# Patient Record
Sex: FEMALE | Race: WHITE | NOT HISPANIC OR LATINO | Employment: FULL TIME | URBAN - METROPOLITAN AREA
[De-identification: names, ages, dates, MRNs, and addresses within clinical notes are randomized per-mention and may not be internally consistent; named-entity substitution may affect disease eponyms.]

---

## 2023-05-18 ENCOUNTER — LAB (OUTPATIENT)
Dept: LAB | Facility: LAB | Age: 43
End: 2023-05-18
Payer: COMMERCIAL

## 2023-05-18 ENCOUNTER — OFFICE VISIT (OUTPATIENT)
Dept: PRIMARY CARE | Facility: CLINIC | Age: 43
End: 2023-05-18
Payer: COMMERCIAL

## 2023-05-18 VITALS
WEIGHT: 183 LBS | BODY MASS INDEX: 30.45 KG/M2 | SYSTOLIC BLOOD PRESSURE: 122 MMHG | DIASTOLIC BLOOD PRESSURE: 80 MMHG | HEART RATE: 70 BPM

## 2023-05-18 DIAGNOSIS — F41.1 GENERALIZED ANXIETY DISORDER: ICD-10-CM

## 2023-05-18 DIAGNOSIS — I42.8 NONISCHEMIC CARDIOMYOPATHY (MULTI): ICD-10-CM

## 2023-05-18 DIAGNOSIS — J98.09 BRONCHOSPASTIC AIRWAY DISEASE: Primary | ICD-10-CM

## 2023-05-18 DIAGNOSIS — F34.1 PERSISTENT DEPRESSIVE DISORDER: ICD-10-CM

## 2023-05-18 DIAGNOSIS — L71.9 ROSACEA: ICD-10-CM

## 2023-05-18 DIAGNOSIS — J98.09 BRONCHOSPASTIC AIRWAY DISEASE: ICD-10-CM

## 2023-05-18 PROBLEM — R45.4 IRRITABLE BEHAVIOR: Status: ACTIVE | Noted: 2023-05-18

## 2023-05-18 PROBLEM — L98.9 BENIGN SKIN LESION OF NECK: Status: ACTIVE | Noted: 2023-05-18

## 2023-05-18 PROBLEM — F32.A DEPRESSION: Status: ACTIVE | Noted: 2023-05-18

## 2023-05-18 PROCEDURE — 99214 OFFICE O/P EST MOD 30 MIN: CPT | Performed by: FAMILY MEDICINE

## 2023-05-18 RX ORDER — METOPROLOL SUCCINATE 25 MG/1
1 TABLET, EXTENDED RELEASE ORAL DAILY
COMMUNITY
End: 2024-03-28 | Stop reason: SDUPTHER

## 2023-05-18 RX ORDER — SERTRALINE HYDROCHLORIDE 50 MG/1
50 TABLET, FILM COATED ORAL DAILY
Qty: 90 TABLET | Refills: 1 | Status: SHIPPED | OUTPATIENT
Start: 2023-05-18 | End: 2024-03-28 | Stop reason: SDUPTHER

## 2023-05-18 RX ORDER — DOXYCYCLINE HYCLATE 100 MG
100 TABLET ORAL DAILY
COMMUNITY
End: 2023-05-18 | Stop reason: SDUPTHER

## 2023-05-18 RX ORDER — DOXYCYCLINE HYCLATE 100 MG
100 TABLET ORAL DAILY
Qty: 90 TABLET | Refills: 1 | Status: SHIPPED | OUTPATIENT
Start: 2023-05-18 | End: 2023-11-14

## 2023-05-18 RX ORDER — LEVONORGESTREL 52 MG/1
INTRAUTERINE DEVICE INTRAUTERINE
COMMUNITY

## 2023-05-18 RX ORDER — LISINOPRIL 2.5 MG/1
1 TABLET ORAL DAILY
COMMUNITY
End: 2024-03-28 | Stop reason: SDUPTHER

## 2023-05-18 RX ORDER — BUDESONIDE, GLYCOPYRROLATE, AND FORMOTEROL FUMARATE 160; 9; 4.8 UG/1; UG/1; UG/1
2 AEROSOL, METERED RESPIRATORY (INHALATION) 2 TIMES DAILY
Qty: 5.9 G | Refills: 0 | COMMUNITY
Start: 2023-05-18

## 2023-05-18 RX ORDER — SERTRALINE HYDROCHLORIDE 50 MG/1
50 TABLET, FILM COATED ORAL DAILY
COMMUNITY
End: 2023-05-18 | Stop reason: SDUPTHER

## 2023-05-18 ASSESSMENT — PATIENT HEALTH QUESTIONNAIRE - PHQ9
2. FEELING DOWN, DEPRESSED OR HOPELESS: NOT AT ALL
SUM OF ALL RESPONSES TO PHQ9 QUESTIONS 1 AND 2: 0
1. LITTLE INTEREST OR PLEASURE IN DOING THINGS: NOT AT ALL

## 2023-05-18 NOTE — PROGRESS NOTES
"Subjective   Patient ID: Dandy Antonio is a 42 y.o. female who presents for Anxiety (zoloft) and Cough (Has had for a month and a half).    HPI  Harsh coughing ongoing for the past 1.5 months.  Often spastic, sometimes feeling as though she is going to \"cough up a lung\" or vomit.  No known exposure to whooping cough, but has never been diagnosed with that either.    Started with allergy symptoms, took some Zyrtec which helped all the other symptoms but the coughing.    Sometimes the coughing becomes worse when lying down at night but denies any reflux-like symptoms.  Minimal PND, coughing nonproductive, mainly harsh to the point where her chest is sore.  She has had some instances of what she describes as wheezing, but those episodes are far and few between.    Her daughter has apparently started to cough as well, so is not certain if she has passed it to her daughter.  Denies any associated fever, chills, body aches.    History of cardiomyopathy diagnosed after her first pregnancy approximately 14 years ago.  She is currently taking medication noted in the chart, requesting refills.    Review of Systems  All pertinent positive symptoms are included in the history of present illness.    All other systems have been reviewed and are negative and noncontributory to this patient's current ailments.    Allergies   Allergen Reactions    Macrolide Antibiotics Rash    Penicillins Rash       Immunization History   Administered Date(s) Administered    Novel influenza-H1N1-09, preservative-free 11/23/2009    Pfizer Purple Cap SARS-CoV-2 05/23/2021, 06/13/2021    Tdap 03/28/2019       Objective   Visit Vitals  /80   Pulse 70   Wt 83 kg (183 lb)   BMI 30.45 kg/m²   Smoking Status Never   BSA 1.95 m²       Physical Exam  CONSTITUTIONAL - well nourished, well developed, looks like stated age, in no acute distress, not ill-appearing, and not tired appearing  SKIN - normal skin color and pigmentation, normal skin turgor without " rash, lesions, or nodules visualized  HEAD - no trauma, normocephalic  EYES - pupils are equal and reactive to light, extraocular muscles are intact, and normal external exam  ENT - TM's intact, no injection, no signs of infection, uvula midline, normal tongue movement and throat normal, no exudate  NECK - supple without rigidity, no neck mass was observed, no thyromegaly or thyroid nodules  CHEST - clear to auscultation, no wheezing, no crackles and no rales, good effort, rather consistent, dry, spastic cough  CARDIAC - regular rate and regular rhythm, no skipped beats, harsh 3/6 murmur  EXTREMITIES - no edema, no deformities  NEUROLOGICAL - normal gait, normal balance, normal motor, no ataxia, DTRs equal and symmetrical; alert, oriented and no focal signs  PSYCHIATRIC - alert, pleasant and cordial, age-appropriate  IMMUNOLOGIC - no cervical lymphadenopathy     Assessment/Plan   Problem List Items Addressed This Visit       Depression     Stable, no changes to medication recommended         Relevant Medications    sertraline (Zoloft) 50 mg tablet    Generalized anxiety disorder     Stable, no changes to medication recommended         Relevant Medications    sertraline (Zoloft) 50 mg tablet    Nonischemic cardiomyopathy (CMS/HCC)     Continue to follow with cardiology per protocol         Relevant Medications    metoprolol succinate XL (Toprol-XL) 25 mg 24 hr tablet    Rosacea     Stable with use of doxycycline, will send at current dose         Relevant Medications    doxycycline (Vibra-Tabs) 100 mg tablet    Bronchospastic airway disease - Primary     Talked about a plethora of possibilities including reactive airway, whooping cough, bronchitis, postnasal drip, reflux disease, etc.    Start Breztri inhaler 2 puffs twice daily, and if no better in 7 days, please obtain chest x-ray to further evaluate for underlying pathology    I will also send culture for pertussis to determine if that is the underlying source  We  will notify of test results once available         Relevant Medications    budesonide-glycopyr-formoterol (Breztri Aerosphere) 160-9-4.8 mcg/actuation HFA aerosol inhaler    Other Relevant Orders    Bordetella Pertussis/Parapertussis PCR    XR chest 2 views

## 2023-05-18 NOTE — ASSESSMENT & PLAN NOTE
Talked about a plethora of possibilities including reactive airway, whooping cough, bronchitis, postnasal drip, reflux disease, etc.    Start Breztri inhaler 2 puffs twice daily, and if no better in 7 days, please obtain chest x-ray to further evaluate for underlying pathology    I will also send culture for pertussis to determine if that is the underlying source  We will notify of test results once available

## 2024-03-28 ENCOUNTER — LAB (OUTPATIENT)
Dept: LAB | Facility: LAB | Age: 44
End: 2024-03-28
Payer: COMMERCIAL

## 2024-03-28 ENCOUNTER — OFFICE VISIT (OUTPATIENT)
Dept: PRIMARY CARE | Facility: CLINIC | Age: 44
End: 2024-03-28
Payer: COMMERCIAL

## 2024-03-28 VITALS
HEART RATE: 68 BPM | BODY MASS INDEX: 31.82 KG/M2 | SYSTOLIC BLOOD PRESSURE: 124 MMHG | WEIGHT: 191 LBS | DIASTOLIC BLOOD PRESSURE: 80 MMHG | HEIGHT: 65 IN

## 2024-03-28 DIAGNOSIS — Z11.4 ENCOUNTER FOR SCREENING FOR HIV: ICD-10-CM

## 2024-03-28 DIAGNOSIS — Z12.11 ENCOUNTER FOR SCREENING FOR MALIGNANT NEOPLASM OF COLON: ICD-10-CM

## 2024-03-28 DIAGNOSIS — Z11.59 NEED FOR HEPATITIS C SCREENING TEST: ICD-10-CM

## 2024-03-28 DIAGNOSIS — Z00.00 ANNUAL PHYSICAL EXAM: ICD-10-CM

## 2024-03-28 DIAGNOSIS — F41.1 GENERALIZED ANXIETY DISORDER: ICD-10-CM

## 2024-03-28 DIAGNOSIS — F34.1 PERSISTENT DEPRESSIVE DISORDER: ICD-10-CM

## 2024-03-28 DIAGNOSIS — I42.8 NONISCHEMIC CARDIOMYOPATHY (MULTI): ICD-10-CM

## 2024-03-28 DIAGNOSIS — R63.5 WEIGHT GAIN: ICD-10-CM

## 2024-03-28 DIAGNOSIS — L71.9 ROSACEA: ICD-10-CM

## 2024-03-28 DIAGNOSIS — Z00.00 ANNUAL PHYSICAL EXAM: Primary | ICD-10-CM

## 2024-03-28 DIAGNOSIS — J98.09 BRONCHOSPASTIC AIRWAY DISEASE: ICD-10-CM

## 2024-03-28 PROBLEM — F32.A DEPRESSION: Status: RESOLVED | Noted: 2023-05-18 | Resolved: 2024-03-28

## 2024-03-28 LAB
ALBUMIN SERPL BCP-MCNC: 4.2 G/DL (ref 3.4–5)
ALP SERPL-CCNC: 57 U/L (ref 33–110)
ALT SERPL W P-5'-P-CCNC: 21 U/L (ref 7–45)
ANION GAP SERPL CALC-SCNC: 10 MMOL/L (ref 10–20)
AST SERPL W P-5'-P-CCNC: 18 U/L (ref 9–39)
BASOPHILS # BLD AUTO: 0.12 X10*3/UL (ref 0–0.1)
BASOPHILS NFR BLD AUTO: 1.5 %
BILIRUB SERPL-MCNC: 0.7 MG/DL (ref 0–1.2)
BUN SERPL-MCNC: 11 MG/DL (ref 6–23)
CALCIUM SERPL-MCNC: 9.1 MG/DL (ref 8.6–10.3)
CHLORIDE SERPL-SCNC: 105 MMOL/L (ref 98–107)
CHOLEST SERPL-MCNC: 179 MG/DL (ref 0–199)
CHOLESTEROL/HDL RATIO: 4.1
CO2 SERPL-SCNC: 26 MMOL/L (ref 21–32)
CREAT SERPL-MCNC: 0.66 MG/DL (ref 0.5–1.05)
EGFRCR SERPLBLD CKD-EPI 2021: >90 ML/MIN/1.73M*2
EOSINOPHIL # BLD AUTO: 0.47 X10*3/UL (ref 0–0.7)
EOSINOPHIL NFR BLD AUTO: 5.7 %
ERYTHROCYTE [DISTWIDTH] IN BLOOD BY AUTOMATED COUNT: 12.3 % (ref 11.5–14.5)
GLUCOSE SERPL-MCNC: 101 MG/DL (ref 74–99)
HCT VFR BLD AUTO: 45.5 % (ref 36–46)
HDLC SERPL-MCNC: 43.6 MG/DL
HGB BLD-MCNC: 15 G/DL (ref 12–16)
IMM GRANULOCYTES # BLD AUTO: 0.02 X10*3/UL (ref 0–0.7)
IMM GRANULOCYTES NFR BLD AUTO: 0.2 % (ref 0–0.9)
LDLC SERPL CALC-MCNC: 95 MG/DL
LYMPHOCYTES # BLD AUTO: 2.5 X10*3/UL (ref 1.2–4.8)
LYMPHOCYTES NFR BLD AUTO: 30.3 %
MCH RBC QN AUTO: 31.3 PG (ref 26–34)
MCHC RBC AUTO-ENTMCNC: 33 G/DL (ref 32–36)
MCV RBC AUTO: 95 FL (ref 80–100)
MONOCYTES # BLD AUTO: 0.32 X10*3/UL (ref 0.1–1)
MONOCYTES NFR BLD AUTO: 3.9 %
NEUTROPHILS # BLD AUTO: 4.81 X10*3/UL (ref 1.2–7.7)
NEUTROPHILS NFR BLD AUTO: 58.4 %
NON HDL CHOLESTEROL: 135 MG/DL (ref 0–149)
NRBC BLD-RTO: 0 /100 WBCS (ref 0–0)
PLATELET # BLD AUTO: 304 X10*3/UL (ref 150–450)
POTASSIUM SERPL-SCNC: 4.2 MMOL/L (ref 3.5–5.3)
PROT SERPL-MCNC: 6.8 G/DL (ref 6.4–8.2)
RBC # BLD AUTO: 4.79 X10*6/UL (ref 4–5.2)
SODIUM SERPL-SCNC: 137 MMOL/L (ref 136–145)
TRIGL SERPL-MCNC: 203 MG/DL (ref 0–149)
TSH SERPL-ACNC: 2.2 MIU/L (ref 0.44–3.98)
VLDL: 41 MG/DL (ref 0–40)
WBC # BLD AUTO: 8.2 X10*3/UL (ref 4.4–11.3)

## 2024-03-28 PROCEDURE — 36415 COLL VENOUS BLD VENIPUNCTURE: CPT

## 2024-03-28 PROCEDURE — 99396 PREV VISIT EST AGE 40-64: CPT | Performed by: FAMILY MEDICINE

## 2024-03-28 PROCEDURE — 87389 HIV-1 AG W/HIV-1&-2 AB AG IA: CPT

## 2024-03-28 PROCEDURE — 80053 COMPREHEN METABOLIC PANEL: CPT

## 2024-03-28 PROCEDURE — 86803 HEPATITIS C AB TEST: CPT

## 2024-03-28 PROCEDURE — 84443 ASSAY THYROID STIM HORMONE: CPT

## 2024-03-28 PROCEDURE — 99214 OFFICE O/P EST MOD 30 MIN: CPT | Performed by: FAMILY MEDICINE

## 2024-03-28 PROCEDURE — 85025 COMPLETE CBC W/AUTO DIFF WBC: CPT

## 2024-03-28 PROCEDURE — 80061 LIPID PANEL: CPT

## 2024-03-28 PROCEDURE — 1036F TOBACCO NON-USER: CPT | Performed by: FAMILY MEDICINE

## 2024-03-28 RX ORDER — METRONIDAZOLE 7.5 MG/G
CREAM TOPICAL 2 TIMES DAILY
Qty: 45 G | Refills: 2 | Status: SHIPPED | OUTPATIENT
Start: 2024-03-28 | End: 2025-03-28

## 2024-03-28 RX ORDER — MONTELUKAST SODIUM 10 MG/1
10 TABLET ORAL NIGHTLY
Qty: 90 TABLET | Refills: 1 | Status: SHIPPED | OUTPATIENT
Start: 2024-03-28 | End: 2024-09-24

## 2024-03-28 RX ORDER — SERTRALINE HYDROCHLORIDE 50 MG/1
50 TABLET, FILM COATED ORAL DAILY
Qty: 90 TABLET | Refills: 1 | Status: SHIPPED | OUTPATIENT
Start: 2024-03-28 | End: 2024-09-24

## 2024-03-28 RX ORDER — METOPROLOL SUCCINATE 25 MG/1
25 TABLET, EXTENDED RELEASE ORAL DAILY
Qty: 90 TABLET | Refills: 1 | Status: SHIPPED | OUTPATIENT
Start: 2024-03-28 | End: 2024-09-24

## 2024-03-28 RX ORDER — DOXYCYCLINE 100 MG/1
100 CAPSULE ORAL DAILY
Qty: 30 CAPSULE | Refills: 0 | Status: SHIPPED | OUTPATIENT
Start: 2024-03-28 | End: 2024-04-27

## 2024-03-28 RX ORDER — ALBUTEROL SULFATE 90 UG/1
2 AEROSOL, METERED RESPIRATORY (INHALATION) EVERY 4 HOURS PRN
Qty: 8.5 G | Refills: 5 | Status: SHIPPED | OUTPATIENT
Start: 2024-03-28 | End: 2025-03-28

## 2024-03-28 RX ORDER — LISINOPRIL 2.5 MG/1
2.5 TABLET ORAL DAILY
Qty: 90 TABLET | Refills: 1 | Status: SHIPPED | OUTPATIENT
Start: 2024-03-28 | End: 2024-09-24

## 2024-03-28 ASSESSMENT — PATIENT HEALTH QUESTIONNAIRE - PHQ9
2. FEELING DOWN, DEPRESSED OR HOPELESS: NOT AT ALL
1. LITTLE INTEREST OR PLEASURE IN DOING THINGS: NOT AT ALL
SUM OF ALL RESPONSES TO PHQ9 QUESTIONS 1 AND 2: 0

## 2024-03-28 NOTE — ASSESSMENT & PLAN NOTE
Physical exam without abnormalities   Due for first colonoscopy, order placed   Please follow with gynecology for upcoming pap and mammogram

## 2024-03-28 NOTE — PROGRESS NOTES
Subjective   Patient ID: Dandy Antonio is a 43 y.o. female who presents for Anxiety, Hypertension, Weight Gain, and Annual Exam.    Past Medical, Surgical, and Family History reviewed and updated in chart.    Reviewed all medications by prescribing practitioner or clinical pharmacist (such as prescriptions, OTCs, herbal therapies and supplements) and documented in the medical record.    HPI  1.  Physical exam.  Pap: follows with gynecology, manages paps and mammograms: has upcoming well woman appointment in a few weeks  Colonoscopy: Due for first colonoscopy  Immunizations: Tdap 2019  Social: Denies tobacco use, social alcohol use 1-2x per week    2.  Reactive airway disease.  Reports allergic symptoms and recent coughing fits  Symptoms have been going on for the last few months  Has difficulty working out because of the coughing fits  Sometimes has coughing fits without exercise as well  Reports concomitant allergic symptoms of itchy eyes, and rhinorrhea    3.  Weight gain.  Has gained significant amount of weight recently (30+ lbs in six months)  States that she is eating the same, but has been working out less due to coughing fits with exercise  Has upcoming appointment with gynecology in a couple weeks  Does not get regular periods due to current IUD  Has not had routine blood work since 2019    4.  Nonischemic cardiomyopathy.  Controlled on Lisinopril 2.5mg and metoprolol 25mg every day   Denies ACS symptoms including palpitations, or chest pain  Follows with cardiology, but states that she lost follow up since COVID  Requesting refills today    6.  Anxiety.  Controlled on Sertraline 50mg every day  Denies SI/HI  Requesting refills today    7.  Rosacea.  Currently takes Doxycycline for outbreaks, but does her best at mitigating factors prior to starting antibiotics  States that symptoms have been significantly worse over the last several months, and would like a dermatology referral   Requesting refills on  doxycycline    Review of Systems  All pertinent positive symptoms are included in the history of present illness.    All other systems have been reviewed and are negative and noncontributory to this patient's current ailments.    Past Medical History:   Diagnosis Date    Depression, unspecified 12/13/2022    Depression    Generalized anxiety disorder 11/09/2021    Generalized anxiety disorder    Other cardiomyopathies (CMS/HCC) 12/13/2022    Nonischemic cardiomyopathy    Perioral dermatitis 03/03/2015    Dermatitis, perioral     Past Surgical History:   Procedure Laterality Date    TONSILLECTOMY  09/24/2013    Tonsillectomy     Social History     Tobacco Use    Smoking status: Never    Smokeless tobacco: Never     No family history on file.  Immunization History   Administered Date(s) Administered    Influenza, Unspecified 10/20/2011    Novel influenza-H1N1-09, preservative-free 11/23/2009    Pfizer Purple Cap SARS-CoV-2 05/23/2021, 06/13/2021    Tdap vaccine, age 7 year and older (BOOSTRIX, ADACEL) 07/25/2009, 03/28/2019     Current Outpatient Medications   Medication Instructions    albuterol (ProAir HFA) 90 mcg/actuation inhaler 2 puffs, inhalation, Every 4 hours PRN    budesonide-glycopyr-formoterol (Breztri Aerosphere) 160-9-4.8 mcg/actuation HFA aerosol inhaler 2 puffs, inhalation, 2 times daily    doxycycline (VIBRAMYCIN) 100 mg, oral, Daily, Take with at least 8 ounces (large glass) of water, do not lie down for 30 minutes after    levonorgestrel (Mirena) 21 mcg/24 hours (8 yrs) 52 mg IUD intrauterine    lisinopril 2.5 mg, oral, Daily    metoprolol succinate XL (TOPROL-XL) 25 mg, oral, Daily    metroNIDAZOLE (Metrocream) 0.75 % cream Topical, 2 times daily, Apply twice daily to rosacea    montelukast (SINGULAIR) 10 mg, oral, Nightly    sertraline (ZOLOFT) 50 mg, oral, Daily     Allergies   Allergen Reactions    Macrolide Antibiotics Rash    Penicillins Rash       Objective   Vitals:    03/28/24 1340 03/28/24  "1355   BP: 140/80 124/80   Pulse: 68    Weight: 86.6 kg (191 lb)    Height: 1.651 m (5' 5\")      Body mass index is 31.78 kg/m².    BP Readings from Last 3 Encounters:   03/28/24 124/80   05/18/23 122/80   12/13/22 126/80      Wt Readings from Last 3 Encounters:   03/28/24 86.6 kg (191 lb)   05/18/23 83 kg (183 lb)   12/13/22 76.2 kg (168 lb)        No visits with results within 1 Month(s) from this visit.   Latest known visit with results is:   [facility] Encounter on 06/28/2021   Component Date Value    Pathology Report 06/28/2021                      Value:    Accession #: [id number]     Date of Procedure:  6/28/2021       Pathologist: DEBORAH HO MD  Date Reported: 7/16/2021  Date Received:  6/28/2021  Submitting Physician: REILLY BOYLE MD                    FINAL CYTOLOGICAL INTERPRETATION        A.  THINPREP PAP CERVICAL:         Specimen Adequacy:  SATISFACTORY FOR EVALUATION.  Quality Indicator: Absence of endocervical/transformation zone component.  Quality Indicator: Partially obscuring inflammation.         General Categorization:  NEGATIVE FOR INTRAEPITHELIAL LESION OR MALIGNANCY.         Descriptive Interpretation:  REACTIVE CELLULAR CHANGES ASSOCIATED WITH INFLAMMATION.    HIGH RISK HPV TEST RESULT:             HPV GENOTYPE  16                      NEGATIVE  HPV GENOTYPE  18                      NEGATIVE  HPV GENOTYPE  OTHER             NEGATIVE    Reference Range: Negative        Testing for high-risk (HR) type of human papilloma virus (HPV) is performed by  the Roche cecilio HPV Test.  The cecilio HPV Test is a qualitati                          ve polymerase chain  reaction that amplifies DNA of HPV16, HPV18 and 12 other high-risk HPV types  (31, 33, 35, 39, 45, 51, 52, 56, 58, 59, 66, and 68) associated with cervical  cancer and its precursor lesions.  A positive result indicates the presence of  HPV DNA due to one or more of the 14 genotypes: 16, 18, 31, 33, 35, 39, 45, 51,  52, 56, 58, 59, 66, " and 68. Negative results indicate HPV DNA concentrations  are undetectable or below the pre-set threshold for detection. False negative  results may be associated with unoptimized sampling. A negative HR HPV result  does not exclude the possibility of future cytologic HSIL or underlying CIN2-3  or cancer.    This test is approved for cervical specimens by  the [place] Food and Drug  Administration. Results of this test should be interpreted in conjunction with  the patient's Pap test results.  Please refer to ASCCP current guidelines for  the use of HPV DNA testing, result interpretation, and patient management.   The performance of this                           test was verified by the Molecular Diagnostic  Laboratory at [facility]. The lab is  certified under the Clinical Laboratory Amendments of 1988 (CLIA 88) as  qualified to perform high complexity clinical laboratory testing.    This specimen has been analyzed by the TabloPrep Imaging System ([employer]  an automated imaging and review system, which assists the laboratory in  evaluating cells on ThinPrep Pap tests. Following automated imaging, selected  fields from every slide were reviewed by a cytotechnologist and/or pathologist.      Electronically Signed Out By DEBOARH HO MD/BO/JAVI/ED   By the signature on this report, the individual or group listed as making the  Final Interpretation/Diagnosis certifies that they have reviewed this case.  Educational Note:  Cervical cytology is a screening procedure primarily for squamous cancers and  precursors and has associated false-negative and false-positive results as  evidenced by published data.  Y                          our patient's test should be interpreted in this  context, together with patient's history and clinical findings.  Regular  sampling and follow-up of unexplained clinical signs and symptoms are  recommended to minimize false negative results.          Clinical History  Date of Last Menstrual Period:     2021    Other Clinical Conditions:  COTEST HPV(Genotype) except for ASC-H, HSIL, Carcinoma - Include HPV Genotype  testing    Clinical Diagnosis History: Encounter for well woman exam with routine  gynecological exam - (Z01.419)   Source of Specimen  A: THINPREP PAP CERVICAL            [facility]  Department of Pathology   [id number] [place] [address]        CONVERTED FINAL DIAGNOSIS 06/28/2021                      Value:A.  THINPREP PAP CERVICAL:         Specimen Adequacy:  SATISFACTORY FOR EVALUATION.  Quality Indicator: Absence of endocervical/transformation zone component.  Quality Indicator: Partially obscuring inflammation.         General Categorization:  NEGATIVE FOR INTRAEPITHELIAL LESION OR MALIGNANCY.         Descriptive Interpretation:  REACTIVE CELLULAR CHANGES ASSOCIATED WITH INFLAMMATION.    HIGH RISK HPV TEST RESULT:             HPV GENOTYPE  16                      NEGATIVE  HPV GENOTYPE  18                      NEGATIVE  HPV GENOTYPE  OTHER             NEGATIVE    Reference Range: Negative          CONVERTED CLINICAL DIAGN* 06/28/2021                      Value:Clinical Diagnosis History: Encounter for well woman exam with routine  gynecological exam - (Z01.419)      CONVERTED DIAGNOSIS COMM* 06/28/2021                      Value:Testing for high-risk (HR) type of human papilloma virus (HPV) is performed by  the Roche cecilio HPV Test.  The cecilio HPV Test is a qualitative polymerase chain  reaction that amplifies DNA of HPV16, HPV18 and 12 other high-risk HPV types  (31, 33, 35, 39, 45, 51, 52, 56, 58, 59, 66, and 68) associated with cervical  cancer and its precursor lesions.  A positive result indicates the presence of  HPV DNA due to one or more of the 14 genotypes: 16, 18, 31, 33, 35, 39, 45, 51,  52, 56, 58, 59, 66, and 68. Negative results indicate HPV DNA concentrations  are undetectable or  below the pre-set threshold for detection. False negative  results may be associated with unoptimized sampling. A negative HR HPV result  does not exclude the possibility of future cytologic HSIL or underlying CIN2-3  or cancer.    This test is approved for cervical specimens by  the US Food and Drug  Administration. Results of this test should be interpreted in conjunction with  the patient's Pap test results.  Please                           refer to ASCCP current guidelines for  the use of HPV DNA testing, result interpretation, and patient management.   The performance of this test was verified by the Molecular Diagnostic  Laboratory at [facility]. The lab is  certified under the Clinical Laboratory Amendments of 1988 (CLIA 88) as  qualified to perform high complexity clinical laboratory testing.    This specimen has been analyzed by the [employer]Prep Imaging System ([employer]  an automated imaging and review system, which assists the laboratory in  evaluating cells on ThinPrep Pap tests. Following automated imaging, selected  fields from every slide were reviewed by a cytotechnologist and/or pathologist.        CONVERTED FINAL REPORT P* 06/28/2021 \\copathshare\copath\PDF 2021_[id number]      Physical Exam  CONSTITUTIONAL - well nourished, well developed, looks like stated age, in no acute distress, not ill-appearing, and not tired appearing  SKIN - erythematous rash present on bilateral cheeks  HEAD - no trauma, normocephalic  EYES - normal external exam  NECK - supple without rigidity, no neck mass was observed, no thyromegaly or thyroid nodules  CHEST - clear to auscultation, no wheezing, no crackles and no rales, good effort  CARDIAC - regular rate and regular rhythm, no skipped beats, no murmur  EXTREMITIES - no obvious or evident edema, no obvious or evident deformities  NEUROLOGICAL - normal gait, normal balance, normal motor, no ataxia, alert, oriented  and no focal signs  PSYCHIATRIC - alert, pleasant and cordial, age-appropriate  IMMUNOLOGIC - no cervical lymphadenopathy    Assessment/Plan   Problem List Items Addressed This Visit       RESOLVED: Depression     Stable. No changes to medical management          Relevant Medications    sertraline (Zoloft) 50 mg tablet    Generalized anxiety disorder     Stable. No changes to medical management          Relevant Medications    sertraline (Zoloft) 50 mg tablet    Nonischemic cardiomyopathy (CMS/HCC)     Recommended to return for routine follow up with cardiology          Relevant Medications    lisinopril 2.5 mg tablet    metoprolol succinate XL (Toprol-XL) 25 mg 24 hr tablet    Rosacea     Will place dermatology referral for worsening rosacea  Discussed importance of mitigating exacerbation factors such as alcohol and caffeine use  Will refill doxycycline, per your preference, but discussed risks of photosensitivity and exacerbation of dermatological symptoms with this particular antibiotic  Will also prescribe topical metronidazole which will not have the side effects of potential photosensitivity; pending dermatology appointment         Relevant Medications    metroNIDAZOLE (Metrocream) 0.75 % cream    doxycycline (Vibramycin) 100 mg capsule    Other Relevant Orders    Referral to Dermatology    Bronchospastic airway disease     Will order albuterol inhailer for PRN use and montelukast for daily use  The montelukast will also help with your allergic symptoms   PFTs ordered, so please obtain so we can evaluate for obstructive/restrictive airway disease  If PFTs normal, will consider other etiologies of coughing fits such as ACEi use or GERD         Relevant Medications    albuterol (ProAir HFA) 90 mcg/actuation inhaler    montelukast (Singulair) 10 mg tablet    Other Relevant Orders    Complete Pulmonary Function Test Pre/Post Bronchodialator (Spirometry Pre/Post/DLCO/Lung Volumes)    Annual physical exam - Primary      Physical exam without abnormalities   Due for first colonoscopy, order placed   Please follow with gynecology for upcoming pap and mammogram         Relevant Orders    Lipid Panel    TSH with reflex to Free T4 if abnormal    Comprehensive Metabolic Panel    CBC and Auto Differential    HIV 1/2 Antigen/Antibody Screen with Reflex to Confirmation    Hepatitis C Antibody    Weight gain     I will order routine labs, including TSH, to evaluate for a possible thyroid disorder that might be contributing to the your weight gain. I will also place a referral to Endocrinology to rule out elevated cortisol levels.  Your increased weight may also be due to decreased physical activity, which could be a result of your coughing fits.         Relevant Orders    Referral to Endocrinology     Other Visit Diagnoses       Encounter for screening for malignant neoplasm of colon        Time for screening colonoscopy, order sent, please schedule at convenience    Relevant Orders    Colonoscopy Screening; Average Risk Patient    Encounter for screening for HIV        Relevant Orders    HIV 1/2 Antigen/Antibody Screen with Reflex to Confirmation    Need for hepatitis C screening test        Relevant Orders    Hepatitis C Antibody

## 2024-03-28 NOTE — ASSESSMENT & PLAN NOTE
I will order routine labs, including TSH, to evaluate for a possible thyroid disorder that might be contributing to the your weight gain. I will also place a referral to Endocrinology to rule out elevated cortisol levels.  Your increased weight may also be due to decreased physical activity, which could be a result of your coughing fits.

## 2024-03-28 NOTE — ASSESSMENT & PLAN NOTE
Will place dermatology referral for worsening rosacea  Discussed importance of mitigating exacerbation factors such as alcohol and caffeine use  Will refill doxycycline, per your preference, but discussed risks of photosensitivity and exacerbation of dermatological symptoms with this particular antibiotic  Will also prescribe topical metronidazole which will not have the side effects of potential photosensitivity; pending dermatology appointment

## 2024-03-28 NOTE — ASSESSMENT & PLAN NOTE
Will order albuterol inhailer for PRN use and montelukast for daily use  The montelukast will also help with your allergic symptoms   PFTs ordered, so please obtain so we can evaluate for obstructive/restrictive airway disease  If PFTs normal, will consider other etiologies of coughing fits such as ACEi use or GERD

## 2024-03-29 LAB
HCV AB SER QL: NONREACTIVE
HIV 1+2 AB+HIV1 P24 AG SERPL QL IA: NONREACTIVE

## 2024-03-29 NOTE — RESULT ENCOUNTER NOTE
Cholesterol 179, 43, 95, 203, about 5 years ago 144, 49, 83, 61    Glucose is stable at 101 previously 101    Electrolytes, kidney, liver, complete blood cell count stable    HIV and hepatitis C are negative    Thyroid perfect with a TSH of 2.20    In essence, the triglycerides are a bit high, but those will drop once you are able to start exercising and being a bit more physically active, which I read has been quite a challenge with the coughing bouts

## 2024-04-11 PROBLEM — L98.9 SKIN LESION: Status: RESOLVED | Noted: 2023-05-18 | Resolved: 2024-04-11

## 2024-04-11 PROBLEM — Z00.00 ANNUAL PHYSICAL EXAM: Status: RESOLVED | Noted: 2024-03-28 | Resolved: 2024-04-11

## 2024-04-11 PROBLEM — L98.9 BENIGN SKIN LESION OF NECK: Status: RESOLVED | Noted: 2023-05-18 | Resolved: 2024-04-11

## 2024-04-12 ENCOUNTER — OFFICE VISIT (OUTPATIENT)
Dept: OBSTETRICS AND GYNECOLOGY | Facility: CLINIC | Age: 44
End: 2024-04-12
Payer: COMMERCIAL

## 2024-04-12 VITALS
SYSTOLIC BLOOD PRESSURE: 122 MMHG | WEIGHT: 190 LBS | DIASTOLIC BLOOD PRESSURE: 80 MMHG | HEIGHT: 66 IN | BODY MASS INDEX: 30.53 KG/M2

## 2024-04-12 DIAGNOSIS — Z01.419 WELL WOMAN EXAM WITH ROUTINE GYNECOLOGICAL EXAM: Primary | ICD-10-CM

## 2024-04-12 DIAGNOSIS — Z12.31 BREAST CANCER SCREENING BY MAMMOGRAM: ICD-10-CM

## 2024-04-12 PROCEDURE — 88175 CYTOPATH C/V AUTO FLUID REDO: CPT

## 2024-04-12 PROCEDURE — 1036F TOBACCO NON-USER: CPT | Performed by: OBSTETRICS & GYNECOLOGY

## 2024-04-12 PROCEDURE — 88141 CYTOPATH C/V INTERPRET: CPT | Performed by: PATHOLOGY

## 2024-04-12 PROCEDURE — 99396 PREV VISIT EST AGE 40-64: CPT | Performed by: OBSTETRICS & GYNECOLOGY

## 2024-04-12 PROCEDURE — 87624 HPV HI-RISK TYP POOLED RSLT: CPT

## 2024-04-12 NOTE — PROGRESS NOTES
Chief Complaint   Patient presents with    Annual Exam     Annual Exam with Pap Smear and Order for Mammogram      Mirena     Would like to discuss possibly being in perimenopause; c/o unintentional weight gain, fatigue, insomnia, brain fog, and hot flashes.  Feels like since her daughters have started their menstrual cycles she has started spotting.    Chaperone declined.      Presents for annual exam.  Daughters are 12 and 14.    Patient notes some challenges with hot flashes, night sweats, interruption in sleep, which has never been great.  Has Mirena IUD and does spot irregularly.  Discussed perimenopausal transition and discussed strategies to improve sleep patterns.    REVIEW OF SYSTEMS    Please see HPI for reported pertinent positives, which would supersede this ROS    Constitutional:  Denies fever, chills, wt gain or loss, fatigue    Genito-Urinary:  Denies genital lesion or sores, vaginal dryness, itching  or pain.  No abnormal vaginal discharge or unexplained vaginal bleeding.  No dysuria, urinary incontinence or frequency.  Denies pelvic pain, dysmenorrhea or dyspareunia.    Eyes:  Denies vision changes, dryness  ENT:  No hearing loss, sinus pain or congestion, nosebleeds  Cardiovascular:  No chest pain or palpitations  Respiratory:  No SOB, cough, wheezing  GI:  No Nausea, vomiting, diarrhea, constipation, abdominal pain  Musculoskeletal:  No new back pain. joint pain, peripheral edema  Skin:  No rash or skin lesion  Neurologic:  No HA, numbness or dizziness  Psychiatric:  No new anxiety or depression  Endocrine:  No loss of hair or hirsutism  Hematologic/lymphatic:  No swollen lymph nodes.  No reported tendency for easy bruising or bleeding    Patient admits to no other systemic complaints      Vitals:    24 1101   BP: 122/80       PHYSICAL EXAM:    PSYCH:  Pt is alert, oriented and cooperative    SKIN: warm, dry, w/o lesion    HEAD AND FACE:  External inspection of eyes, ears, functional  cranial nerves normal and intact    THYROID:  No thyromegaly    CARDIOVASCULAR:  Warm and well Perfused    PULMONARY:  No respiratory distress    ABDOMEN:  soft, nontender.  No mass or organomegaly.      BREAST:  Breasts are symmetric to inspection and palpation.  No mass palpable bilaterally.  There is no axillary lymphadenopathy    PELVIC:    External genitalia, urethra, perianal region normal to inspection and palpation if indicated.  No inguinal LA    Vagina without lesions.    Cervix seen and visually normal      Bimanual exam:      No pelvic mass palpable    Uterus nontender, midline in pelvis    No adnexal masses or tenderness    Assessment/Plan    Diagnoses and all orders for this visit:  Well woman exam with routine gynecological exam  -     THINPREP PAP TEST  Breast cancer screening by mammogram  -     BI mammo bilateral screening tomosynthesis; Future

## 2024-04-23 ENCOUNTER — HOSPITAL ENCOUNTER (OUTPATIENT)
Dept: RADIOLOGY | Facility: HOSPITAL | Age: 44
Discharge: HOME | End: 2024-04-23
Payer: COMMERCIAL

## 2024-04-23 VITALS — WEIGHT: 190 LBS | BODY MASS INDEX: 30.53 KG/M2 | HEIGHT: 66 IN

## 2024-04-23 DIAGNOSIS — Z12.31 BREAST CANCER SCREENING BY MAMMOGRAM: ICD-10-CM

## 2024-04-23 PROCEDURE — 77067 SCR MAMMO BI INCL CAD: CPT

## 2024-04-23 PROCEDURE — 77067 SCR MAMMO BI INCL CAD: CPT | Performed by: RADIOLOGY

## 2024-04-23 PROCEDURE — 77063 BREAST TOMOSYNTHESIS BI: CPT | Performed by: RADIOLOGY

## 2024-04-24 DIAGNOSIS — R92.8 ABNORMAL MAMMOGRAM: Primary | ICD-10-CM

## 2024-04-25 LAB
CYTOLOGY CMNT CVX/VAG CYTO-IMP: NORMAL
HPV HR 12 DNA GENITAL QL NAA+PROBE: NEGATIVE
HPV HR GENOTYPES PNL CVX NAA+PROBE: NEGATIVE
HPV16 DNA SPEC QL NAA+PROBE: NEGATIVE
HPV18 DNA SPEC QL NAA+PROBE: NEGATIVE
LAB AP CONTRACEPTIVE HISTORY: NORMAL
LAB AP HPV GENOTYPE QUESTION: YES
LAB AP HPV HR: NORMAL
LABORATORY COMMENT REPORT: NORMAL
PATH REPORT.TOTAL CANCER: NORMAL

## 2024-06-20 ENCOUNTER — HOSPITAL ENCOUNTER (OUTPATIENT)
Dept: RADIOLOGY | Facility: HOSPITAL | Age: 44
Discharge: HOME | End: 2024-06-20
Payer: COMMERCIAL

## 2024-06-20 DIAGNOSIS — R92.8 ABNORMAL MAMMOGRAM: ICD-10-CM

## 2024-06-20 PROCEDURE — 77061 BREAST TOMOSYNTHESIS UNI: CPT | Mod: LT

## 2024-06-20 PROCEDURE — 76982 USE 1ST TARGET LESION: CPT | Mod: LT

## 2024-06-20 PROCEDURE — 76642 ULTRASOUND BREAST LIMITED: CPT | Mod: LT

## 2024-09-25 DIAGNOSIS — F41.1 GENERALIZED ANXIETY DISORDER: ICD-10-CM

## 2024-09-25 DIAGNOSIS — F34.1 PERSISTENT DEPRESSIVE DISORDER: ICD-10-CM

## 2024-09-25 RX ORDER — SERTRALINE HYDROCHLORIDE 50 MG/1
50 TABLET, FILM COATED ORAL DAILY
Qty: 30 TABLET | Refills: 0 | Status: SHIPPED | OUTPATIENT
Start: 2024-09-25 | End: 2024-10-25

## 2024-09-30 ENCOUNTER — APPOINTMENT (OUTPATIENT)
Dept: PRIMARY CARE | Facility: CLINIC | Age: 44
End: 2024-09-30
Payer: COMMERCIAL

## 2024-09-30 VITALS
HEART RATE: 82 BPM | DIASTOLIC BLOOD PRESSURE: 68 MMHG | BODY MASS INDEX: 29.86 KG/M2 | OXYGEN SATURATION: 98 % | WEIGHT: 185 LBS | SYSTOLIC BLOOD PRESSURE: 110 MMHG

## 2024-09-30 DIAGNOSIS — J98.09 BRONCHOSPASTIC AIRWAY DISEASE: Primary | ICD-10-CM

## 2024-09-30 DIAGNOSIS — J30.9 ALLERGIC RHINITIS, UNSPECIFIED SEASONALITY, UNSPECIFIED TRIGGER: ICD-10-CM

## 2024-09-30 DIAGNOSIS — F41.1 GENERALIZED ANXIETY DISORDER: ICD-10-CM

## 2024-09-30 PROCEDURE — 99214 OFFICE O/P EST MOD 30 MIN: CPT | Performed by: FAMILY MEDICINE

## 2024-09-30 PROCEDURE — 1036F TOBACCO NON-USER: CPT | Performed by: FAMILY MEDICINE

## 2024-09-30 RX ORDER — MONTELUKAST SODIUM 10 MG/1
10 TABLET ORAL NIGHTLY
Qty: 90 TABLET | Refills: 1 | Status: SHIPPED | OUTPATIENT
Start: 2024-09-30 | End: 2025-03-29

## 2024-09-30 RX ORDER — SERTRALINE HYDROCHLORIDE 50 MG/1
50 TABLET, FILM COATED ORAL DAILY
Qty: 90 TABLET | Refills: 1 | Status: SHIPPED | OUTPATIENT
Start: 2024-09-30 | End: 2025-03-29

## 2024-09-30 RX ORDER — ALBUTEROL SULFATE 90 UG/1
2 INHALANT RESPIRATORY (INHALATION) EVERY 4 HOURS PRN
Qty: 8.5 G | Refills: 5 | Status: SHIPPED | OUTPATIENT
Start: 2024-09-30 | End: 2025-09-30

## 2024-09-30 NOTE — ASSESSMENT & PLAN NOTE
Stable. No changes to medical management.  A refill of your medication has been sent to the pharmacy.

## 2024-09-30 NOTE — ASSESSMENT & PLAN NOTE
Condition stable. No changes to medication.  A refill of your medication has been sent to the pharmacy.

## 2024-09-30 NOTE — PROGRESS NOTES
Subjective   Patient ID: Dandy Antonio is a 44 y.o. female who presents for Depression and Bronchospastic Airway Disease.    Past Medical, Surgical, and Family History reviewed and updated in chart.    Reviewed all medications by prescribing practitioner or clinical pharmacist (such as prescriptions, OTCs, herbal therapies and supplements) and documented in the medical record.    HPI  1. Shortness of Breath  Dandy reports experiencing some shortness of breath when she is feeling under the weather with a viral infection. She had a viral infection three weeks ago and used her albuterol inhaler for post-viral cough and shortness of breath. Dandy indicates that she cannot take Robitussin or any other medication that may increase her heart rate due to her history of postpartum cardiomyopathy, for which she takes metoprolol and lisinopril.    2. Allergic Rhinitis  Dandy's symptoms are well-controlled on Singulair 10 mg nightly. She is requesting a refill of this medication at this time.    3. Anxiety and Depression  Dandy's symptoms are well-controlled on Zoloft 50 mg daily. She denies any homicidal or suicidal ideation at this time.    Review of Systems  All pertinent positive symptoms are included in the history of present illness.    All other systems have been reviewed and are negative and noncontributory to this patient's current ailments.    Past Medical History:   Diagnosis Date    Depression, unspecified 12/13/2022    Depression    Generalized anxiety disorder 11/09/2021    Generalized anxiety disorder    Other cardiomyopathies (Multi) 12/13/2022    Nonischemic cardiomyopathy    Perioral dermatitis 03/03/2015    Dermatitis, perioral    Skin lesion 05/18/2023     Past Surgical History:   Procedure Laterality Date    TONSILLECTOMY  09/24/2013    Tonsillectomy     Social History     Tobacco Use    Smoking status: Never    Smokeless tobacco: Never   Vaping Use    Vaping status: Never Used   Substance Use Topics     Alcohol use: Yes    Drug use: Never     No family history on file.  Immunization History   Administered Date(s) Administered    Flu vaccine (IIV4), preservative free *Check age/dose* 12/13/2022    Influenza, Unspecified 10/20/2011    Novel influenza-H1N1-09, preservative-free 11/23/2009    Pfizer Purple Cap SARS-CoV-2 05/23/2021, 06/13/2021    Tdap vaccine, age 7 year and older (BOOSTRIX, ADACEL) 07/25/2009, 03/28/2019     Current Outpatient Medications   Medication Instructions    albuterol (ProAir HFA) 90 mcg/actuation inhaler 2 puffs, inhalation, Every 4 hours PRN    budesonide-glycopyr-formoterol (Breztri Aerosphere) 160-9-4.8 mcg/actuation HFA aerosol inhaler 2 puffs, inhalation, 2 times daily    levonorgestrel (Mirena) 21 mcg/24 hours (8 yrs) 52 mg IUD intrauterine    lisinopril 2.5 mg, oral, Daily    metoprolol succinate XL (TOPROL-XL) 25 mg, oral, Daily    metroNIDAZOLE (Metrocream) 0.75 % cream Topical, 2 times daily, Apply twice daily to rosacea    montelukast (SINGULAIR) 10 mg, oral, Nightly    sertraline (ZOLOFT) 50 mg, oral, Daily     Allergies   Allergen Reactions    Macrolide Antibiotics Rash    Penicillins Rash     Objective   Vitals:    09/30/24 0817   BP: 110/68   BP Location: Left arm   Patient Position: Sitting   BP Cuff Size: Adult   Pulse: 82   SpO2: 98%   Weight: 83.9 kg (185 lb)     Body mass index is 29.86 kg/m².    BP Readings from Last 3 Encounters:   09/30/24 110/68   04/12/24 122/80   03/28/24 124/80      Wt Readings from Last 3 Encounters:   09/30/24 83.9 kg (185 lb)   04/23/24 86.2 kg (190 lb)   04/12/24 86.2 kg (190 lb)        No visits with results within 1 Month(s) from this visit.   Latest known visit with results is:   Office Visit on 04/12/2024   Component Date Value    Case Report 04/12/2024                      Value:Gynecologic Cytology                              Case: [id number]                                   Authorizing Provider:  Zhen Malagon MD     Collected:            04/12/2024 1147              Ordering Location:     [facility]       Received:            04/12/2024 1147              First Screen:          JEROME Bear                                                             Pathologist:           Tolu Zhao MD                                                         Specimen:    ThinPrep Liquid-Based Pap-Imaging System Screen, CERVIX, SCREENING                         Final Cytological Interp* 04/12/2024                      Value:                                                    A. THINPREP PAP CERVIX, SCREENING -                                                     Specimen Adequacy                          Satisfactory for evaluation; absence of endocervical/transformation zone                           component                          Quality Indicator: Partially obscuring inflammation, Quality Indicator:                           Partially obscured by cytolysis                                                    General Categorization                          Negative for intraepithelial lesion or malignancy.                                                    Descriptive Interpretation                          Negative for intraepithelial lesion or malignancy                          Reactive cellular changes associated with inflammation                                                                                    04/12/2024                      Value:Slide(s) initially screened by JEROME Bear at [facility]                           [id number] [facility] [id number]                          By the signature on this report, the individual or group listed as making                           the Final Interpretation/Diagnosis certifies that they have reviewed this                           case.     ThinPrep Imaging System 04/12/2024                      Value:This specimen has been analyzed  by the YASSSUPrep Imaging System ([employer] an automated imaging and review system, which assists the                           laboratory in evaluating cells on ThinPrep Pap tests. Following automated                           imaging, selected fields from every slide were reviewed by a                           cytotechnologist and/or pathologist.                              Educational Note 04/12/2024                      Value:Cervical cytology is a screening procedure primarily for squamous cancers                           and precursors and has associated false-negative and false-positives                           results as evidenced by published data. Your patient's test should be                           interpreted in this context, together with the patient's history and                           clinical findings. Regular sampling and follow-up of unexplained clinical                           signs and symptoms are recommended to minimize false negative results.    Perform HPV HR test? 04/12/2024 Always (all interpretations)     Include HPV Genotype? 04/12/2024 Yes     Contraceptive History 04/12/2024                      Value:IUD    HPV, high-risk 04/12/2024 Negative     HPV Type 16 DNA 04/12/2024 Negative     HPV Type 18 DNA 04/12/2024 Negative     HPV non-Type 16 or 18 DNA 04/12/2024 Negative      Physical Exam  CONSTITUTIONAL - well nourished, well developed, looks like stated age, in no acute distress, not ill-appearing, and not tired appearing  SKIN - normal skin color and pigmentation, normal skin turgor without rash, lesions, or nodules visualized  HEAD - no trauma, normocephalic  EYES -  extraocular muscles are intact, and normal external exam  CHEST - clear to auscultation, no wheezing, no crackles and no rales, good effort  CARDIAC - regular rate and regular rhythm, no skipped beats, no murmur  EXTREMITIES - no obvious or evident edema, no obvious or evident  deformities  NEUROLOGICAL -  alert, oriented and no focal signs  PSYCHIATRIC - alert, pleasant and cordial, age-appropriate    Assessment/Plan   Problem List Items Addressed This Visit       Generalized anxiety disorder - Primary     Stable. No changes to medical management.  A refill of your medication has been sent to the pharmacy.           Relevant Medications    sertraline (Zoloft) 50 mg tablet    Bronchospastic airway disease     Condition stable. No changes to medication.  A refill of your medication has been sent to the pharmacy.           Relevant Medications    albuterol (ProAir HFA) 90 mcg/actuation inhaler    montelukast (Singulair) 10 mg tablet    Allergic rhinitis     Condition stable. No changes to medication.  A refill of your medication has been sent to the pharmacy.           Relevant Medications    montelukast (Singulair) 10 mg tablet

## 2025-06-24 DIAGNOSIS — F41.1 GENERALIZED ANXIETY DISORDER: ICD-10-CM

## 2025-06-24 RX ORDER — SERTRALINE HYDROCHLORIDE 50 MG/1
50 TABLET, FILM COATED ORAL DAILY
Qty: 22 TABLET | Refills: 0 | Status: SHIPPED | OUTPATIENT
Start: 2025-06-24 | End: 2025-07-16

## 2025-07-15 NOTE — PROGRESS NOTES
Chief Complaint  Patient ID: Dandy Antonio is a 44 y.o. female who presents for Annual Exam.    Past Medical, Surgical, and Family History reviewed and updated in chart.    Reviewed all medications by prescribing practitioner or clinical pharmacist (such as prescriptions, OTCs, herbal therapies and supplements) and documented in the medical record.    History of Present Illness  1. Physical Exam     - Pap Smear: Completed by Dr. Malagon on April 12, 2024; results were normal with negative HPV.  Dandy is planning a follow-up with Dr. Malagon soon.     - Colonoscopy: Due for first colonoscopy, not yet performed.     - Immunizations: Up-to-date.     - Social History: Denies tobacco use; consumes alcohol socially 1-2 times per week.     - Mammogram: Conducted in June 2024; initially abnormal, followed by a diagnostic mammogram and breast ultrasound, which were normal. Annual follow-up recommended, with the gynecologist to order future screenings.    2. Reactive Airway Disease/Allergies     - Reports persistent allergic symptoms.     - Uses albuterol as needed; does not use daily medication.     - Previously tried Singulair without benefit.     - Interested in receiving a steroid injection today to help manage allergy-triggered symptoms.    3. Nonischemic Cardiomyopathy     - Controlled on Lisinopril 2.5 mg and Metoprolol 25 mg daily.     - Denies acute coronary syndrome (ACS) symptoms, including palpitations or chest pain.     - No longer following with cardiology due to cost concerns related to annual echocardiograms.    4. Anxiety/Irritability     - Feels that Zoloft 50 mg daily is no longer effective.     - Considering a dose increase due to persistent irritability and poor sleep.     - Reports worsening rosacea, possibly related to hormonal changes.     - Recently had an IUD placed, does not menstruate, and is uncertain about perimenopause status. Observes similar symptoms in her daughters, raising questions about  "candidacy for hormone replacement therapy (HRT).    5. Rosacea     - Previously treated with doxycycline for outbreaks, which was discontinued in favor of Singulair, but symptoms have worsened.     - Experiencing facial lesions, redness, and painful bumps, particularly around the lips.     - Interested in resuming doxycycline, consulting a dermatologist, and exploring more potent medication options, as topical treatments have been ineffective.    Review of Systems  All pertinent positive symptoms are included in the history of present illness.    All other systems have been reviewed and are negative and noncontributory to this patient's current ailments.    Past Medical History  She has a past medical history of Anxiety, Depression, unspecified (12/13/2022), Generalized anxiety disorder (11/09/2021), GERD (gastroesophageal reflux disease), Other cardiomyopathies (12/13/2022), Perioral dermatitis (03/03/2015), and Skin lesion (05/18/2023).    Surgical History  She has a past surgical history that includes Tonsillectomy (09/24/2013).     Social History  She reports that she has never smoked. She has never used smokeless tobacco. She reports current alcohol use. She reports that she does not use drugs.    Family History[1]  Medications Prior to Visit[2]    Allergies  Macrolide antibiotics and Penicillins    Immunization History   Administered Date(s) Administered    COVID-19, mRNA, LNP-S, PF, 30 mcg/0.3 mL dose 05/23/2021, 06/13/2021    Flu vaccine (IIV4), preservative free *Check age/dose* 12/13/2022    Influenza, Unspecified 10/20/2011    Novel influenza-H1N1-09, preservative-free 11/23/2009    Tdap vaccine, age 7 year and older (BOOSTRIX, ADACEL) 07/25/2009, 03/28/2019     Objective   Visit Vitals  /76 (BP Location: Left arm, Patient Position: Sitting, BP Cuff Size: Adult)   Pulse 82   Ht 1.676 m (5' 6\")   Wt 87.4 kg (192 lb 9.6 oz)   SpO2 98%   BMI 31.09 kg/m²   OB Status Implant   Smoking Status Never   BSA " 2.02 m²        BP Readings from Last 3 Encounters:   07/16/25 110/76   09/30/24 110/68   04/12/24 122/80      Wt Readings from Last 3 Encounters:   07/16/25 87.4 kg (192 lb 9.6 oz)   09/30/24 83.9 kg (185 lb)   04/23/24 86.2 kg (190 lb)       Relevant Results  Office Visit on 07/16/2025   Component Date Value    POC Color, Urine 07/16/2025 Yellow     POC Appearance, Urine 07/16/2025 Clear     POC Glucose, Urine 07/16/2025 NEGATIVE     POC Bilirubin, Urine 07/16/2025 NEGATIVE     POC Ketones, Urine 07/16/2025 NEGATIVE     POC Specific Gravity, Ur* 07/16/2025 <=1.005     POC Blood, Urine 07/16/2025 NEGATIVE     POC PH, Urine 07/16/2025 7.0     POC Protein, Urine 07/16/2025 NEGATIVE     POC Urobilinogen, Urine 07/16/2025 0.2     Poc Nitrite, Urine 07/16/2025 NEGATIVE     POC Leukocytes, Urine 07/16/2025 NEGATIVE      The 10-year ASCVD risk score ([place] MARTINEZ, et al., 2019) is: 0.8%    Values used to calculate the score:      Age: 44 years      Clincally relevant sex: Female      Is Non- : No      Diabetic: No      Tobacco smoker: No      Systolic Blood Pressure: 110 mmHg      Is BP treated: No      HDL Cholesterol: 43.6 mg/dL      Total Cholesterol: 179 mg/dL    Physical Exam  CONSTITUTIONAL - well nourished, well developed, looks like stated age, in no acute distress, not ill-appearing, and not tired appearing  SKIN - normal skin color and pigmentation, normal skin turgor without  lesions, or nodules visualized; diffusely scattered erythematous rash on the forehead, cheeks, nasal bridge, chin, neck with small erythematous bumps around the perioral region  HEAD - no trauma, normocephalic  EYES - pupils are equal and reactive to light, extraocular muscles are intact, and normal external exam  ENT - TM's intact, no injection, no signs of infection, uvula midline, normal tongue movement and throat normal, no exudate  NECK - supple without rigidity, no neck mass was observed, no thyromegaly or thyroid  nodules  CHEST - clear to auscultation, no wheezing, no crackles and no rales, good effort  CARDIAC - regular rate and regular rhythm, no skipped beats, no murmur  ABDOMEN - no organomegaly, soft, nontender, nondistended, normal bowel sounds, no guarding/rebound/rigidity, negative McBurney sign and negative Rosenthal sign  EXTREMITIES - no obvious or evident edema, no obvious or evident deformities  NEUROLOGICAL - normal gait, normal balance, normal motor, no ataxia, DTRs equal and symmetrical; alert, oriented and no focal signs  PSYCHIATRIC - alert, pleasant and cordial, age-appropriate  IMMUNOLOGIC - no cervical lymphadenopathy    Assessment and Plan  Problem List Items Addressed This Visit       Generalized anxiety disorder    Based on your history, I feel that you are going through perimenopause and you would be a good candidate for a low-dose estrogen patch  I am going to increase your Zoloft to 100 mg daily, but if you are able to start HRT, I suspect that your irritability, anxiety, sleep, even possibly rosacea are all going to improve    Please speak with your gynecologist about this at your upcoming visit         Relevant Medications    sertraline (Zoloft) 100 mg tablet    Irritable behavior    Based on your history, I feel that you are going through perimenopause and you would be a good candidate for a low-dose estrogen patch  I am going to increase your Zoloft to 100 mg daily, but if you are able to start HRT, I suspect that your irritability, anxiety, sleep, even possibly rosacea are all going to improve    Please speak with your gynecologist about this at your upcoming visit         Nonischemic cardiomyopathy (Multi)    I realize that it is costly to visit cardiology, but you may want to consider that, although your heart sounds are excellent today without any murmur noted  Continue medications as prescribed         Relevant Medications    lisinopril 2.5 mg tablet    metoprolol succinate XL (Toprol-XL) 25  mg 24 hr tablet    Rosacea    I will get dermatology involved in your care secondary to your inflammatory rosacea    In the interim, I would suggest starting doxycycline 40 mg once daily which provides an anti-inflammatory benefit without significant antimicrobial activity.  This particular regimen has demonstrated significant reductions in inflammatory lesion counts and is associated with fewer gastrointestinal side effects compared to higher (100 mg) doses, with similar efficacy         Relevant Medications    doxycycline (Oracea) 40 mg DR capsule    Other Relevant Orders    Referral to Dermatology    Bronchospastic airway disease    Solu-Medrol 125 mg IM was provided in the office today hoping to help decrease the symptoms that you are experiencing with both nasal allergies along with your reactive airway disease    Continue albuterol as needed         Annual physical exam - Primary    Complete history and physical examination was performed  EKG reveals sinus rhythm without acute changes  We will notify of test results once available         Relevant Orders    Lipid Panel    TSH with reflex to Free T4 if abnormal    Comprehensive Metabolic Panel    CBC and Auto Differential    POCT UA (nonautomated) manually resulted (Completed)    ECG 12 Lead (Completed)    Hemoglobin A1C    Allergic rhinitis    Solu-Medrol 125 mg IM was provided in the office today hoping to help decrease the symptoms that you are experiencing with both nasal allergies along with your reactive airway disease         Relevant Medications    methylPREDNISolone sodium succinate (PF) (SOLU-Medrol) injection 125 mg (Completed)     Other Visit Diagnoses         Colon cancer screening        Colonoscopy declined, but reluctantly agreed to the Cologuard which will be sent to your home    Relevant Orders    Cologuard® colon cancer screening      Screening for cardiovascular condition        Relevant Orders    Lipid Panel      Screening for thyroid  disorder        Relevant Orders    TSH with reflex to Free T4 if abnormal      Screening for blood disease        Relevant Orders    CBC and Auto Differential      Screening for diabetes mellitus        Relevant Orders    Hemoglobin A1C               [1] No family history on file.  [2]   Outpatient Medications Prior to Visit   Medication Sig Dispense Refill    albuterol (ProAir HFA) 90 mcg/actuation inhaler Inhale 2 puffs every 4 hours if needed for wheezing or shortness of breath. 8.5 g 5    levonorgestrel (Mirena) 21 mcg/24 hours (8 yrs) 52 mg IUD by intrauterine route.      budesonide-glycopyr-formoterol (Breztri Aerosphere) 160-9-4.8 mcg/actuation HFA aerosol inhaler Inhale 2 puffs 2 times a day. 5.9 g 0    lisinopril 2.5 mg tablet Take 1 tablet (2.5 mg) by mouth once daily. 90 tablet 1    metoprolol succinate XL (Toprol-XL) 25 mg 24 hr tablet Take 1 tablet (25 mg) by mouth once daily. 90 tablet 1    montelukast (Singulair) 10 mg tablet Take 1 tablet (10 mg) by mouth once daily at bedtime. 90 tablet 1    sertraline (Zoloft) 50 mg tablet Take 1 tablet (50 mg) by mouth once daily for 22 days. 22 tablet 0     No facility-administered medications prior to visit.

## 2025-07-16 ENCOUNTER — APPOINTMENT (OUTPATIENT)
Dept: PRIMARY CARE | Facility: CLINIC | Age: 45
End: 2025-07-16
Payer: COMMERCIAL

## 2025-07-16 VITALS
SYSTOLIC BLOOD PRESSURE: 110 MMHG | WEIGHT: 192.6 LBS | DIASTOLIC BLOOD PRESSURE: 76 MMHG | BODY MASS INDEX: 30.95 KG/M2 | OXYGEN SATURATION: 98 % | HEIGHT: 66 IN | HEART RATE: 82 BPM

## 2025-07-16 DIAGNOSIS — Z13.6 SCREENING FOR CARDIOVASCULAR CONDITION: ICD-10-CM

## 2025-07-16 DIAGNOSIS — Z12.11 COLON CANCER SCREENING: ICD-10-CM

## 2025-07-16 DIAGNOSIS — R45.4 IRRITABLE BEHAVIOR: ICD-10-CM

## 2025-07-16 DIAGNOSIS — Z13.0 SCREENING FOR BLOOD DISEASE: ICD-10-CM

## 2025-07-16 DIAGNOSIS — J30.1 SEASONAL ALLERGIC RHINITIS DUE TO POLLEN: ICD-10-CM

## 2025-07-16 DIAGNOSIS — F41.1 GENERALIZED ANXIETY DISORDER: ICD-10-CM

## 2025-07-16 DIAGNOSIS — Z13.29 SCREENING FOR THYROID DISORDER: ICD-10-CM

## 2025-07-16 DIAGNOSIS — Z13.1 SCREENING FOR DIABETES MELLITUS: ICD-10-CM

## 2025-07-16 DIAGNOSIS — I42.8 NONISCHEMIC CARDIOMYOPATHY (MULTI): ICD-10-CM

## 2025-07-16 DIAGNOSIS — J98.09 BRONCHOSPASTIC AIRWAY DISEASE: ICD-10-CM

## 2025-07-16 DIAGNOSIS — Z00.00 ANNUAL PHYSICAL EXAM: Primary | ICD-10-CM

## 2025-07-16 DIAGNOSIS — L71.9 ROSACEA: ICD-10-CM

## 2025-07-16 LAB
POC APPEARANCE, URINE: CLEAR
POC BILIRUBIN, URINE: NEGATIVE
POC BLOOD, URINE: NEGATIVE
POC COLOR, URINE: YELLOW
POC GLUCOSE, URINE: NEGATIVE MG/DL
POC KETONES, URINE: NEGATIVE MG/DL
POC LEUKOCYTES, URINE: NEGATIVE
POC NITRITE,URINE: NEGATIVE
POC PH, URINE: 7 PH
POC PROTEIN, URINE: NEGATIVE MG/DL
POC SPECIFIC GRAVITY, URINE: <=1.005
POC UROBILINOGEN, URINE: 0.2 EU/DL

## 2025-07-16 PROCEDURE — 81002 URINALYSIS NONAUTO W/O SCOPE: CPT | Performed by: FAMILY MEDICINE

## 2025-07-16 PROCEDURE — 99396 PREV VISIT EST AGE 40-64: CPT | Performed by: FAMILY MEDICINE

## 2025-07-16 PROCEDURE — 93000 ELECTROCARDIOGRAM COMPLETE: CPT | Performed by: FAMILY MEDICINE

## 2025-07-16 PROCEDURE — 96372 THER/PROPH/DIAG INJ SC/IM: CPT | Performed by: FAMILY MEDICINE

## 2025-07-16 PROCEDURE — 99214 OFFICE O/P EST MOD 30 MIN: CPT | Performed by: FAMILY MEDICINE

## 2025-07-16 PROCEDURE — 3008F BODY MASS INDEX DOCD: CPT | Performed by: FAMILY MEDICINE

## 2025-07-16 PROCEDURE — 1036F TOBACCO NON-USER: CPT | Performed by: FAMILY MEDICINE

## 2025-07-16 RX ORDER — DOXYCYCLINE 40 MG/1
40 CAPSULE ORAL EVERY MORNING
Qty: 30 CAPSULE | Refills: 2 | Status: SHIPPED | OUTPATIENT
Start: 2025-07-16 | End: 2025-10-14

## 2025-07-16 RX ORDER — METHYLPREDNISOLONE SODIUM SUCCINATE 125 MG/2ML
125 INJECTION INTRAMUSCULAR; INTRAVENOUS ONCE
Status: COMPLETED | OUTPATIENT
Start: 2025-07-16 | End: 2025-07-16

## 2025-07-16 RX ORDER — SERTRALINE HYDROCHLORIDE 100 MG/1
100 TABLET, FILM COATED ORAL DAILY
Qty: 90 TABLET | Refills: 1 | Status: SHIPPED | OUTPATIENT
Start: 2025-07-16 | End: 2026-01-12

## 2025-07-16 RX ORDER — LISINOPRIL 2.5 MG/1
2.5 TABLET ORAL DAILY
Qty: 90 TABLET | Refills: 1 | Status: SHIPPED | OUTPATIENT
Start: 2025-07-16 | End: 2026-01-12

## 2025-07-16 RX ORDER — METOPROLOL SUCCINATE 25 MG/1
25 TABLET, EXTENDED RELEASE ORAL DAILY
Qty: 90 TABLET | Refills: 1 | Status: SHIPPED | OUTPATIENT
Start: 2025-07-16 | End: 2026-01-12

## 2025-07-16 RX ADMIN — METHYLPREDNISOLONE SODIUM SUCCINATE 125 MG: 125 INJECTION INTRAMUSCULAR; INTRAVENOUS at 10:09

## 2025-07-16 NOTE — ASSESSMENT & PLAN NOTE
I will get dermatology involved in your care secondary to your inflammatory rosacea    In the interim, I would suggest starting doxycycline 40 mg once daily which provides an anti-inflammatory benefit without significant antimicrobial activity.  This particular regimen has demonstrated significant reductions in inflammatory lesion counts and is associated with fewer gastrointestinal side effects compared to higher (100 mg) doses, with similar efficacy

## 2025-07-16 NOTE — ASSESSMENT & PLAN NOTE
I realize that it is costly to visit cardiology, but you may want to consider that, although your heart sounds are excellent today without any murmur noted  Continue medications as prescribed

## 2025-07-16 NOTE — ASSESSMENT & PLAN NOTE
Based on your history, I feel that you are going through perimenopause and you would be a good candidate for a low-dose estrogen patch  I am going to increase your Zoloft to 100 mg daily, but if you are able to start HRT, I suspect that your irritability, anxiety, sleep, even possibly rosacea are all going to improve    Please speak with your gynecologist about this at your upcoming visit

## 2025-07-16 NOTE — ASSESSMENT & PLAN NOTE
Solu-Medrol 125 mg IM was provided in the office today hoping to help decrease the symptoms that you are experiencing with both nasal allergies along with your reactive airway disease   Adult

## 2025-07-16 NOTE — ASSESSMENT & PLAN NOTE
Solu-Medrol 125 mg IM was provided in the office today hoping to help decrease the symptoms that you are experiencing with both nasal allergies along with your reactive airway disease    Continue albuterol as needed